# Patient Record
Sex: FEMALE | Race: WHITE | NOT HISPANIC OR LATINO
[De-identification: names, ages, dates, MRNs, and addresses within clinical notes are randomized per-mention and may not be internally consistent; named-entity substitution may affect disease eponyms.]

---

## 2017-06-09 ENCOUNTER — TRANSCRIPTION ENCOUNTER (OUTPATIENT)
Age: 52
End: 2017-06-09

## 2017-12-31 ENCOUNTER — TRANSCRIPTION ENCOUNTER (OUTPATIENT)
Age: 52
End: 2017-12-31

## 2018-09-19 PROBLEM — Z00.00 ENCOUNTER FOR PREVENTIVE HEALTH EXAMINATION: Status: ACTIVE | Noted: 2018-09-19

## 2018-10-19 ENCOUNTER — APPOINTMENT (OUTPATIENT)
Dept: GASTROENTEROLOGY | Facility: CLINIC | Age: 53
End: 2018-10-19
Payer: COMMERCIAL

## 2018-10-19 VITALS
DIASTOLIC BLOOD PRESSURE: 100 MMHG | HEIGHT: 63 IN | OXYGEN SATURATION: 98 % | WEIGHT: 230 LBS | BODY MASS INDEX: 40.75 KG/M2 | RESPIRATION RATE: 15 BRPM | SYSTOLIC BLOOD PRESSURE: 130 MMHG | HEART RATE: 65 BPM

## 2018-10-19 DIAGNOSIS — Z80.3 FAMILY HISTORY OF MALIGNANT NEOPLASM OF BREAST: ICD-10-CM

## 2018-10-19 DIAGNOSIS — Z78.9 OTHER SPECIFIED HEALTH STATUS: ICD-10-CM

## 2018-10-19 PROCEDURE — 99204 OFFICE O/P NEW MOD 45 MIN: CPT

## 2018-10-19 RX ORDER — BENAZEPRIL HYDROCHLORIDE 20 MG/1
20 TABLET, FILM COATED ORAL
Refills: 0 | Status: ACTIVE | COMMUNITY

## 2018-12-04 ENCOUNTER — APPOINTMENT (OUTPATIENT)
Dept: GASTROENTEROLOGY | Facility: CLINIC | Age: 53
End: 2018-12-04
Payer: COMMERCIAL

## 2018-12-04 VITALS — SYSTOLIC BLOOD PRESSURE: 130 MMHG | DIASTOLIC BLOOD PRESSURE: 90 MMHG

## 2018-12-04 VITALS — WEIGHT: 226 LBS | HEIGHT: 63 IN | OXYGEN SATURATION: 98 % | BODY MASS INDEX: 40.04 KG/M2 | RESPIRATION RATE: 16 BRPM

## 2018-12-04 DIAGNOSIS — Z87.19 PERSONAL HISTORY OF OTHER DISEASES OF THE DIGESTIVE SYSTEM: ICD-10-CM

## 2018-12-04 PROCEDURE — 99214 OFFICE O/P EST MOD 30 MIN: CPT

## 2018-12-14 ENCOUNTER — APPOINTMENT (OUTPATIENT)
Dept: GASTROENTEROLOGY | Facility: GI CENTER | Age: 53
End: 2018-12-14
Payer: COMMERCIAL

## 2018-12-14 ENCOUNTER — RESULT REVIEW (OUTPATIENT)
Age: 53
End: 2018-12-14

## 2018-12-14 ENCOUNTER — OUTPATIENT (OUTPATIENT)
Dept: OUTPATIENT SERVICES | Facility: HOSPITAL | Age: 53
LOS: 1 days | End: 2018-12-14
Payer: COMMERCIAL

## 2018-12-14 DIAGNOSIS — Z12.11 ENCOUNTER FOR SCREENING FOR MALIGNANT NEOPLASM OF COLON: ICD-10-CM

## 2018-12-14 PROCEDURE — 45385 COLONOSCOPY W/LESION REMOVAL: CPT | Mod: 33

## 2018-12-14 PROCEDURE — 45385 COLONOSCOPY W/LESION REMOVAL: CPT | Mod: PT

## 2018-12-14 PROCEDURE — 88305 TISSUE EXAM BY PATHOLOGIST: CPT

## 2018-12-14 PROCEDURE — 88305 TISSUE EXAM BY PATHOLOGIST: CPT | Mod: 26

## 2018-12-14 RX ORDER — POLYETHYLENE GLYOCOL 3350, SODIUM CHLORIDE, SODIUM BICARBONATE AND POTASSIUM CHLORIDE 420; 11.2; 5.72; 1.48 G/4L; G/4L; G/4L; G/4L
420 POWDER, FOR SOLUTION NASOGASTRIC; ORAL
Qty: 1 | Refills: 0 | Status: COMPLETED | COMMUNITY
Start: 2018-10-19 | End: 2018-12-14

## 2018-12-18 LAB — SURGICAL PATHOLOGY FINAL REPORT - CH: SIGNIFICANT CHANGE UP

## 2019-01-06 ENCOUNTER — TRANSCRIPTION ENCOUNTER (OUTPATIENT)
Age: 54
End: 2019-01-06

## 2019-01-23 ENCOUNTER — MOBILE ON CALL (OUTPATIENT)
Age: 54
End: 2019-01-23

## 2019-06-27 ENCOUNTER — TRANSCRIPTION ENCOUNTER (OUTPATIENT)
Age: 54
End: 2019-06-27

## 2019-06-27 ENCOUNTER — APPOINTMENT (OUTPATIENT)
Dept: ENDOCRINOLOGY | Facility: CLINIC | Age: 54
End: 2019-06-27
Payer: COMMERCIAL

## 2019-06-27 VITALS
WEIGHT: 228 LBS | HEIGHT: 63 IN | HEART RATE: 84 BPM | SYSTOLIC BLOOD PRESSURE: 122 MMHG | DIASTOLIC BLOOD PRESSURE: 90 MMHG | BODY MASS INDEX: 40.4 KG/M2

## 2019-06-27 PROCEDURE — 99203 OFFICE O/P NEW LOW 30 MIN: CPT

## 2019-06-27 NOTE — HISTORY OF PRESENT ILLNESS
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

## 2019-06-27 NOTE — PHYSICAL EXAM
[No Acute Distress] : no acute distress [Well Nourished] : well nourished [Alert] : alert [Normal Sclera/Conjunctiva] : normal sclera/conjunctiva [Well Developed] : well developed [EOMI] : extra ocular movement intact [No Proptosis] : no proptosis [No Respiratory Distress] : no respiratory distress [No Thyroid Nodules] : there were no palpable thyroid nodules [Normal Rate] : heart rate was normal  [No Accessory Muscle Use] : no accessory muscle use [Clear to Auscultation] : lungs were clear to auscultation bilaterally [Pedal Pulses Normal] : the pedal pulses are present [Regular Rhythm] : with a regular rhythm [Normal S1, S2] : normal S1 and S2 [No Edema] : there was no peripheral edema [Anterior Cervical Nodes] : anterior cervical nodes [Post Cervical Nodes] : posterior cervical nodes [Spine Straight] : spine straight [No Spinal Tenderness] : no spinal tenderness [Normal] : normal and non tender [No Stigmata of Cushings Syndrome] : no stigmata of cushings syndrome [Normal Gait] : normal gait [Normal Strength/Tone] : muscle strength and tone were normal [No Rash] : no rash [Normal Reflexes] : deep tendon reflexes were 2+ and symmetric [Oriented x3] : oriented to person, place, and time [No Tremors] : no tremors [Acanthosis Nigricans] : no acanthosis nigricans [de-identified] : left thryoid greater thant right thryoid in size   firm  nodule on left upper pole  about 3 cm in size ,  extending lateral and posteriorly    NO EDWARD

## 2019-06-27 NOTE — ASSESSMENT
[FreeTextEntry1] : MNG - will send pt to radiology - prefers [facility] as her daughter works there \par  will aspirate 2 left lobe module- can attempt calcified nodule in lower pole\par  and isthrmus nodule \par  NO ASA VIT E OR Omega 3 1 week prior to FNA \par  no heavy lifting  for 2 days after \par reviewd films oN [facility] portal - left nodule with coarse macrocalcification  \par racquel have Thryoseq on stand by \par Hypothryoid Cont RX but take on empty stoamch separate from her benazepril \par \par

## 2019-07-29 ENCOUNTER — APPOINTMENT (OUTPATIENT)
Dept: ENDOCRINOLOGY | Facility: CLINIC | Age: 54
End: 2019-07-29
Payer: COMMERCIAL

## 2019-07-29 VITALS
BODY MASS INDEX: 40.57 KG/M2 | OXYGEN SATURATION: 100 % | HEIGHT: 63 IN | DIASTOLIC BLOOD PRESSURE: 82 MMHG | WEIGHT: 229 LBS | HEART RATE: 65 BPM | SYSTOLIC BLOOD PRESSURE: 124 MMHG

## 2019-07-29 PROCEDURE — 76536 US EXAM OF HEAD AND NECK: CPT

## 2019-07-29 PROCEDURE — 99215 OFFICE O/P EST HI 40 MIN: CPT | Mod: 25

## 2019-08-04 NOTE — ASSESSMENT
[FreeTextEntry1] : MNG - post FNA with pain \par Snogram done here showed stable rigth sided and isthmus nodule\par  left lobe nodviolet obrien increased in size 5 x 4 cm prior measured approxiamtely 2 x 2 cm \par \par willsend pt for stat CT of neck with contrast \par \par \par   whil ept went for CT  pathologist from [facility] called me + PTC in [place]hm[place] \par highly suspicious in right and left lobe\par \par \par CTscan reviewd - needs urgent ENT eval- got appoointmetn with pt for Dr Lemos \par may need thryoidectomy today\par \par received call from  Pathologist at [facility]- all 3 noudels  + PTC \par \par \par dw pt after CT scan on neck - gave her info on appointemnet with Dr Lemos \par as well as pathaology results \par \par Pt racquel go to ENT now

## 2019-08-04 NOTE — HISTORY OF PRESENT ILLNESS
[FreeTextEntry1] : Pt here for follow up  after  FNA MNG \par  Pt called a few hours ago and  statedthat since her FNA- she has been experiencung some nausa and lighheadedness \par   was gettign worse until day 3 - very painful to sallow \par \par took aleve and tylenol - cant swallow saliva today \par  can touch her neck today \par but cannot move left side of neck

## 2019-08-04 NOTE — PHYSICAL EXAM
[Alert] : alert [No Acute Distress] : no acute distress [Well Nourished] : well nourished [Well Developed] : well developed [Normal Sclera/Conjunctiva] : normal sclera/conjunctiva [EOMI] : extra ocular movement intact [No Respiratory Distress] : no respiratory distress [No Accessory Muscle Use] : no accessory muscle use [Clear to Auscultation] : lungs were clear to auscultation bilaterally [Normal Rate] : heart rate was normal  [Normal S1, S2] : normal S1 and S2 [Regular Rhythm] : with a regular rhythm [No Edema] : there was no peripheral edema [Post Cervical Nodes] : posterior cervical nodes [Anterior Cervical Nodes] : anterior cervical nodes [Normal] : normal and non tender [No Stigmata of Cushings Syndrome] : no stigmata of cushings syndrome [Normal Gait] : normal gait [Normal Strength/Tone] : muscle strength and tone were normal [No Rash] : no rash [Acanthosis Nigricans] : no acanthosis nigricans [Normal Reflexes] : deep tendon reflexes were 2+ and symmetric [No Tremors] : no tremors [Oriented x3] : oriented to person, place, and time [de-identified] : left thyroid fullness

## 2019-08-08 ENCOUNTER — OTHER (OUTPATIENT)
Age: 54
End: 2019-08-08

## 2019-08-29 ENCOUNTER — APPOINTMENT (OUTPATIENT)
Dept: ENDOCRINOLOGY | Facility: CLINIC | Age: 54
End: 2019-08-29
Payer: COMMERCIAL

## 2019-08-29 VITALS
WEIGHT: 220 LBS | SYSTOLIC BLOOD PRESSURE: 130 MMHG | DIASTOLIC BLOOD PRESSURE: 96 MMHG | HEART RATE: 94 BPM | BODY MASS INDEX: 38.98 KG/M2 | HEIGHT: 63 IN

## 2019-08-29 PROCEDURE — 99214 OFFICE O/P EST MOD 30 MIN: CPT

## 2019-08-29 RX ORDER — HYDROCORTISONE ACETATE 25 MG/1
25 SUPPOSITORY RECTAL TWICE DAILY
Qty: 28 | Refills: 0 | Status: DISCONTINUED | COMMUNITY
Start: 2018-10-19 | End: 2019-08-29

## 2019-08-29 RX ORDER — LEVOTHYROXINE SODIUM 0.05 MG/1
50 TABLET ORAL
Refills: 0 | Status: DISCONTINUED | COMMUNITY
End: 2019-08-29

## 2019-08-29 RX ORDER — POLYETHYLENE GLYCOL 3350 17 G/17G
17 POWDER, FOR SOLUTION ORAL DAILY
Qty: 3 | Refills: 3 | Status: DISCONTINUED | COMMUNITY
Start: 2018-10-19 | End: 2019-08-29

## 2019-08-29 NOTE — PHYSICAL EXAM
[Alert] : alert [No Acute Distress] : no acute distress [Well Nourished] : well nourished [Well Developed] : well developed [EOMI] : extra ocular movement intact [Normal Sclera/Conjunctiva] : normal sclera/conjunctiva [No Proptosis] : no proptosis [No LAD] : no lymphadenopathy [Well Healed Scar] : well healed scar [No Respiratory Distress] : no respiratory distress [No Accessory Muscle Use] : no accessory muscle use [Clear to Auscultation] : lungs were clear to auscultation bilaterally [Normal Rate] : heart rate was normal  [Normal S1, S2] : normal S1 and S2 [Regular Rhythm] : with a regular rhythm [No Edema] : there was no peripheral edema [Post Cervical Nodes] : posterior cervical nodes [Anterior Cervical Nodes] : anterior cervical nodes [Normal] : normal and non tender [No Stigmata of Cushings Syndrome] : no stigmata of cushings syndrome [Normal Gait] : normal gait [Normal Strength/Tone] : muscle strength and tone were normal [No Rash] : no rash [Acanthosis Nigricans] : no acanthosis nigricans [No Tremors] : no tremors [Normal Reflexes] : deep tendon reflexes were 2+ and symmetric [Oriented x3] : oriented to person, place, and time [de-identified] : midline scar present  [de-identified] : no thryoid felt

## 2019-08-29 NOTE — REVIEW OF SYSTEMS
[Dysphonia] : no dysphonia [Neck Pain] : no neck pain [Negative] : Heme/Lymph [FreeTextEntry4] : voice  alittle  off in high range

## 2019-08-29 NOTE — HISTORY OF PRESENT ILLNESS
[FreeTextEntry1] : Pt is s/p total thryoidectomy tall cell cvariant \par  with local invasion \par  \par s/p toatl thyroidecotmy 8/9/10 with Dr coffey \par ?LNinvolvment but via extrathryoideal etension\par  - doing well on 0.150 mg s LT$ - takign correctly \par  lesspalpitiaon today \par \par no trmors or shake\par

## 2019-08-29 NOTE — ASSESSMENT
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

## 2019-10-28 ENCOUNTER — APPOINTMENT (OUTPATIENT)
Dept: NUCLEAR MEDICINE | Facility: CLINIC | Age: 54
End: 2019-10-28
Payer: COMMERCIAL

## 2019-10-28 ENCOUNTER — OUTPATIENT (OUTPATIENT)
Dept: OUTPATIENT SERVICES | Facility: HOSPITAL | Age: 54
LOS: 1 days | End: 2019-10-28

## 2019-10-28 DIAGNOSIS — C73 MALIGNANT NEOPLASM OF THYROID GLAND: ICD-10-CM

## 2019-10-29 ENCOUNTER — APPOINTMENT (OUTPATIENT)
Dept: ENDOCRINOLOGY | Facility: CLINIC | Age: 54
End: 2019-10-29

## 2019-10-29 ENCOUNTER — APPOINTMENT (OUTPATIENT)
Dept: NUCLEAR MEDICINE | Facility: CLINIC | Age: 54
End: 2019-10-29

## 2019-10-30 ENCOUNTER — APPOINTMENT (OUTPATIENT)
Dept: NUCLEAR MEDICINE | Facility: CLINIC | Age: 54
End: 2019-10-30

## 2019-11-01 ENCOUNTER — APPOINTMENT (OUTPATIENT)
Dept: NUCLEAR MEDICINE | Facility: CLINIC | Age: 54
End: 2019-11-01

## 2019-11-03 ENCOUNTER — FORM ENCOUNTER (OUTPATIENT)
Age: 54
End: 2019-11-03

## 2019-11-04 PROCEDURE — 78016 THYROID MET IMAGING/STUDIES: CPT | Mod: 26

## 2019-11-04 PROCEDURE — 78018 THYROID MET IMAGING BODY: CPT | Mod: 26

## 2019-11-04 PROCEDURE — 78803 RP LOCLZJ TUM SPECT 1 AREA: CPT | Mod: 26

## 2019-11-15 ENCOUNTER — MOBILE ON CALL (OUTPATIENT)
Age: 54
End: 2019-11-15

## 2019-11-17 ENCOUNTER — MOBILE ON CALL (OUTPATIENT)
Age: 54
End: 2019-11-17

## 2019-11-26 ENCOUNTER — CHART COPY (OUTPATIENT)
Age: 54
End: 2019-11-26

## 2019-12-03 ENCOUNTER — APPOINTMENT (OUTPATIENT)
Dept: ENDOCRINOLOGY | Facility: CLINIC | Age: 54
End: 2019-12-03

## 2019-12-10 ENCOUNTER — OUTPATIENT (OUTPATIENT)
Dept: OUTPATIENT SERVICES | Facility: HOSPITAL | Age: 54
LOS: 1 days | End: 2019-12-10

## 2019-12-10 ENCOUNTER — APPOINTMENT (OUTPATIENT)
Dept: NUCLEAR MEDICINE | Facility: CLINIC | Age: 54
End: 2019-12-10
Payer: COMMERCIAL

## 2019-12-10 DIAGNOSIS — C73 MALIGNANT NEOPLASM OF THYROID GLAND: ICD-10-CM

## 2019-12-11 ENCOUNTER — FORM ENCOUNTER (OUTPATIENT)
Age: 54
End: 2019-12-11

## 2019-12-11 ENCOUNTER — APPOINTMENT (OUTPATIENT)
Dept: NUCLEAR MEDICINE | Facility: CLINIC | Age: 54
End: 2019-12-11

## 2019-12-12 ENCOUNTER — APPOINTMENT (OUTPATIENT)
Dept: NUCLEAR MEDICINE | Facility: CLINIC | Age: 54
End: 2019-12-12

## 2019-12-12 PROCEDURE — 79005 NUCLEAR RX ORAL ADMIN: CPT | Mod: 26

## 2019-12-18 ENCOUNTER — FORM ENCOUNTER (OUTPATIENT)
Age: 54
End: 2019-12-18

## 2019-12-19 ENCOUNTER — OUTPATIENT (OUTPATIENT)
Dept: OUTPATIENT SERVICES | Facility: HOSPITAL | Age: 54
LOS: 1 days | End: 2019-12-19

## 2019-12-19 ENCOUNTER — APPOINTMENT (OUTPATIENT)
Dept: NUCLEAR MEDICINE | Facility: CLINIC | Age: 54
End: 2019-12-19
Payer: COMMERCIAL

## 2019-12-19 DIAGNOSIS — Z00.8 ENCOUNTER FOR OTHER GENERAL EXAMINATION: ICD-10-CM

## 2019-12-19 PROCEDURE — 78018 THYROID MET IMAGING BODY: CPT | Mod: 26

## 2020-01-10 ENCOUNTER — APPOINTMENT (OUTPATIENT)
Dept: ENDOCRINOLOGY | Facility: CLINIC | Age: 55
End: 2020-01-10
Payer: COMMERCIAL

## 2020-01-10 VITALS
BODY MASS INDEX: 36.49 KG/M2 | HEART RATE: 100 BPM | WEIGHT: 206 LBS | SYSTOLIC BLOOD PRESSURE: 130 MMHG | DIASTOLIC BLOOD PRESSURE: 80 MMHG

## 2020-01-10 PROCEDURE — 99213 OFFICE O/P EST LOW 20 MIN: CPT

## 2020-01-26 NOTE — PHYSICAL EXAM
[Alert] : alert [No Acute Distress] : no acute distress [Well Nourished] : well nourished [Well Developed] : well developed [Normal Sclera/Conjunctiva] : normal sclera/conjunctiva [No Proptosis] : no proptosis [EOMI] : extra ocular movement intact [No Respiratory Distress] : no respiratory distress [Well Healed Scar] : well healed scar [No LAD] : no lymphadenopathy [Clear to Auscultation] : lungs were clear to auscultation bilaterally [Normal Rate] : heart rate was normal  [No Accessory Muscle Use] : no accessory muscle use [Normal S1, S2] : normal S1 and S2 [Regular Rhythm] : with a regular rhythm [Post Cervical Nodes] : posterior cervical nodes [Anterior Cervical Nodes] : anterior cervical nodes [No Edema] : there was no peripheral edema [Normal] : normal and non tender [No Stigmata of Cushings Syndrome] : no stigmata of cushings syndrome [Normal Gait] : normal gait [Normal Strength/Tone] : muscle strength and tone were normal [No Rash] : no rash [Normal Reflexes] : deep tendon reflexes were 2+ and symmetric [No Tremors] : no tremors [Oriented x3] : oriented to person, place, and time [Acanthosis Nigricans] : no acanthosis nigricans [de-identified] : midline scar present  [de-identified] : no thryoid felt

## 2020-01-26 NOTE — ASSESSMENT
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

## 2020-01-26 NOTE — HISTORY OF PRESENT ILLNESS
[FreeTextEntry1] : Pt is s/p total thryoidectomy tall cell cvariant \par  with local invasion \par  \par s/p total thyroidecotmy 8/9/10 with Dr coffey \par no LN removed\par  pt had  TA tx \par developed thrusgh afterwards  and severe dry mouth \par   been suign biotene\par  tooothpaste and mouth wash \par   doing better with thrush \par \par  - doing well on 0.150 mg s LT$ - takign correctly \par  lesspalpitiaon today \par \par no trmors or shake\par

## 2020-01-26 NOTE — REVIEW OF SYSTEMS
[Negative] : Heme/Lymph [As Noted in HPI] : as noted in HPI [Dysphonia] : no dysphonia [Neck Pain] : no neck pain

## 2020-03-09 ENCOUNTER — APPOINTMENT (OUTPATIENT)
Dept: ENDOCRINOLOGY | Facility: CLINIC | Age: 55
End: 2020-03-09

## 2020-03-10 ENCOUNTER — RX RENEWAL (OUTPATIENT)
Age: 55
End: 2020-03-10

## 2020-03-20 ENCOUNTER — APPOINTMENT (OUTPATIENT)
Dept: GASTROENTEROLOGY | Facility: CLINIC | Age: 55
End: 2020-03-20

## 2020-04-17 ENCOUNTER — APPOINTMENT (OUTPATIENT)
Dept: ENDOCRINOLOGY | Facility: CLINIC | Age: 55
End: 2020-04-17
Payer: COMMERCIAL

## 2020-04-17 PROCEDURE — 99213 OFFICE O/P EST LOW 20 MIN: CPT | Mod: 95

## 2020-04-19 NOTE — HISTORY OF PRESENT ILLNESS
[Home] : at home, [unfilled] , at the time of the visit. [Other Location: e.g. Home (Enter Location, City,State)___] : at [unfilled] [Patient] : the patient [Self] : self [FreeTextEntry1] : Pt is s/p total thryoidectomy tall cell cvariant \par  with local invasion \par on LT4 0.150 m qd \par  \par s/p total thyroidecotmy 8/9/10 with Dr coffey \par no LN removed\par  pt had  TA tx \par pt has been feelign better- saw ENT for dry mouth - gave vinegar - burned \par  saw dentis using  mouth melts which have helped \par \par \par \par \par

## 2020-04-19 NOTE — ASSESSMENT
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

## 2020-04-19 NOTE — REASON FOR VISIT
[Follow - Up] : a follow-up visit [Thyroid Cancer] : thyroid cancer [Follow-Up: _____] : a [unfilled] follow-up visit

## 2020-04-19 NOTE — PHYSICAL EXAM
[Alert] : alert [Healthy Appearance] : healthy appearance [No Acute Distress] : no acute distress [Normal Voice/Communication] : normal voice communication [Oriented x3] : oriented to person, place, and time

## 2020-09-04 ENCOUNTER — RX RENEWAL (OUTPATIENT)
Age: 55
End: 2020-09-04

## 2020-09-22 ENCOUNTER — APPOINTMENT (OUTPATIENT)
Dept: ENDOCRINOLOGY | Facility: CLINIC | Age: 55
End: 2020-09-22
Payer: COMMERCIAL

## 2020-09-22 ENCOUNTER — APPOINTMENT (OUTPATIENT)
Dept: ENDOCRINOLOGY | Facility: CLINIC | Age: 55
End: 2020-09-22

## 2020-09-22 VITALS
BODY MASS INDEX: 38.62 KG/M2 | HEIGHT: 63 IN | WEIGHT: 218 LBS | DIASTOLIC BLOOD PRESSURE: 86 MMHG | HEART RATE: 91 BPM | SYSTOLIC BLOOD PRESSURE: 124 MMHG

## 2020-09-22 PROCEDURE — 99213 OFFICE O/P EST LOW 20 MIN: CPT

## 2020-09-22 NOTE — PHYSICAL EXAM
[Alert] : alert [Well Nourished] : well nourished [No Acute Distress] : no acute distress [Well Developed] : well developed [Normal Sclera/Conjunctiva] : normal sclera/conjunctiva [EOMI] : extra ocular movement intact [No Proptosis] : no proptosis [Well Healed Scar] : well healed scar [No Respiratory Distress] : no respiratory distress [No Accessory Muscle Use] : no accessory muscle use [Clear to Auscultation] : lungs were clear to auscultation bilaterally [Normal S1, S2] : normal S1 and S2 [Normal Rate] : heart rate was normal [Regular Rhythm] : with a regular rhythm [No Edema] : no peripheral edema [Normal Anterior Cervical Nodes] : no anterior cervical lymphadenopathy [Normal Posterior Cervical Nodes] : no posterior cervical lymphadenopathy [No Stigmata of Cushings Syndrome] : no stigmata of Cushings Syndrome [No Tremors] : no tremors [Oriented x3] : oriented to person, place, and time [Normal Affect] : the affect was normal [Normal Mood] : the mood was normal [Acanthosis Nigricans] : no acanthosis nigricans

## 2020-09-22 NOTE — ASSESSMENT
[FreeTextEntry1] : 55 year old female with hypothyroidism s/p thyroidectomy and I-131 for thyroid cancer (tall cell variant with extrathyroidal extension). Goal TSH 0.01 to 0.1.\par -TSH at goal (0.081).\par -Continue current dose of LT4.\par -Ok to return to work.\par -Detectable Tg, but lower than in the past (although done at different labs).\par -Check neck ultrasound now.\par -Repeat TFTs with Tg and CMP in 3 months.\par \par \par

## 2020-09-22 NOTE — REVIEW OF SYSTEMS
[Fatigue] : fatigue [Recent Weight Gain (___ Lbs)] : recent weight gain: [unfilled] lbs [Neck Pain] : neck pain [Recent Weight Loss (___ Lbs)] : no recent weight loss [Dysphagia] : no dysphagia [Dysphonia] : no dysphonia [Palpitations] : no palpitations [Constipation] : no constipation [Diarrhea] : no diarrhea [Muscle Weakness] : no muscle weakness [Myalgia] : no myalgia  [Dry Skin] : no dry skin [Hair Loss] : no hair loss [Tremors] : no tremors [Depression] : no depression [Anxiety] : no anxiety [Cold Intolerance] : no cold intolerance [Heat Intolerance] : no heat intolerance [Swelling] : no swelling [FreeTextEntry4] : pain and itchy at incision site [FreeTextEntry7] : h/o IBS

## 2020-09-22 NOTE — HISTORY OF PRESENT ILLNESS
[FreeTextEntry1] : Patient presents today for follow-up of hypothyroidism s/p total thyroidectomy August 2019 for thyroid cancer, tall cell variant with local invasion. No lymph nodes removed. Had TA, resulting in damage to salivary glands now with dry mouth and dental issues. Had oral surgery two weeks ago.\par \par Current regimen:\par LT4 150 mcg daily, takes appropriately.\par Reports intermittent pain and itching at incision site.\par Denies dysphagia and voice changes.

## 2020-12-14 ENCOUNTER — OUTPATIENT (OUTPATIENT)
Dept: OUTPATIENT SERVICES | Facility: HOSPITAL | Age: 55
LOS: 1 days | End: 2020-12-14

## 2020-12-14 ENCOUNTER — APPOINTMENT (OUTPATIENT)
Dept: NUCLEAR MEDICINE | Facility: CLINIC | Age: 55
End: 2020-12-14
Payer: COMMERCIAL

## 2020-12-14 DIAGNOSIS — C73 MALIGNANT NEOPLASM OF THYROID GLAND: ICD-10-CM

## 2020-12-15 ENCOUNTER — APPOINTMENT (OUTPATIENT)
Dept: NUCLEAR MEDICINE | Facility: CLINIC | Age: 55
End: 2020-12-15

## 2020-12-16 ENCOUNTER — APPOINTMENT (OUTPATIENT)
Dept: NUCLEAR MEDICINE | Facility: CLINIC | Age: 55
End: 2020-12-16

## 2020-12-16 PROBLEM — Z12.11 ENCOUNTER FOR SCREENING COLONOSCOPY: Status: RESOLVED | Noted: 2018-10-19 | Resolved: 2020-12-16

## 2020-12-18 ENCOUNTER — RESULT REVIEW (OUTPATIENT)
Age: 55
End: 2020-12-18

## 2020-12-18 ENCOUNTER — APPOINTMENT (OUTPATIENT)
Dept: NUCLEAR MEDICINE | Facility: CLINIC | Age: 55
End: 2020-12-18

## 2020-12-18 ENCOUNTER — LABORATORY RESULT (OUTPATIENT)
Age: 55
End: 2020-12-18

## 2020-12-18 PROCEDURE — 78018 THYROID MET IMAGING BODY: CPT | Mod: 26

## 2020-12-18 PROCEDURE — 78830 RP LOCLZJ TUM SPECT W/CT 1: CPT | Mod: 26

## 2020-12-21 ENCOUNTER — RESULT REVIEW (OUTPATIENT)
Age: 55
End: 2020-12-21

## 2020-12-21 PROCEDURE — 78016 THYROID MET IMAGING/STUDIES: CPT | Mod: 26

## 2021-01-20 ENCOUNTER — APPOINTMENT (OUTPATIENT)
Dept: ENDOCRINOLOGY | Facility: CLINIC | Age: 56
End: 2021-01-20
Payer: COMMERCIAL

## 2021-01-20 VITALS
WEIGHT: 212 LBS | SYSTOLIC BLOOD PRESSURE: 122 MMHG | DIASTOLIC BLOOD PRESSURE: 80 MMHG | BODY MASS INDEX: 37.56 KG/M2 | HEIGHT: 63 IN

## 2021-01-20 PROCEDURE — 99214 OFFICE O/P EST MOD 30 MIN: CPT

## 2021-01-20 PROCEDURE — 99072 ADDL SUPL MATRL&STAF TM PHE: CPT

## 2021-01-25 NOTE — HISTORY OF PRESENT ILLNESS
[FreeTextEntry1] : Patient presents today for follow-up of hypothyroidism s/p total thyroidectomy August 2019 for thyroid cancer, tall cell variant with local invasion. No lymph nodes removed. Had TA, resulting in damage to salivary glands now with dry mouth and dental issues.   had scan in Dec - all negative TGthryogen stim TG  less that 0.2 \par \par Current regimen:\par LT4 150 mcg daily, takes appropriately.\par Reports intermittent pain and itching at incision site.\par Denies dysphagia and voice changes.\par seing GI IBS symptoms at times \par    has never follwoup with surgeon last year  \par

## 2021-01-25 NOTE — PHYSICAL EXAM
[Alert] : alert [Well Nourished] : well nourished [No Acute Distress] : no acute distress [Well Developed] : well developed [Normal Sclera/Conjunctiva] : normal sclera/conjunctiva [EOMI] : extra ocular movement intact [No Proptosis] : no proptosis [Well Healed Scar] : well healed scar [No Respiratory Distress] : no respiratory distress [No Accessory Muscle Use] : no accessory muscle use [Clear to Auscultation] : lungs were clear to auscultation bilaterally [Normal S1, S2] : normal S1 and S2 [Normal Rate] : heart rate was normal [Regular Rhythm] : with a regular rhythm [No Edema] : no peripheral edema [Normal Anterior Cervical Nodes] : no anterior cervical lymphadenopathy [No Stigmata of Cushings Syndrome] : no stigmata of Cushings Syndrome [No Tremors] : no tremors [Oriented x3] : oriented to person, place, and time [Normal Affect] : the affect was normal [Normal Mood] : the mood was normal [Acanthosis Nigricans] : no acanthosis nigricans [de-identified] : but thickend scar

## 2021-01-25 NOTE — ASSESSMENT
[FreeTextEntry1] : 55 year old female with hypothyroidism s/p thyroidectomy and I-131 for thyroid cancer (tall cell variant with extrathyroidal extension). Goal TSH 0.01 to 0.1.\par -TSH at goal (0.081).\par -Continue current dose of LT4.\par -Ok to return to work.\par   thryogen stim WBS showed TIGIST   ? hiatal hernia- see GI\par  TG  level 0.2 after thryogen stim \par  no furhter tx needed with TA \par \par -Check neck ultrasound now.\par -Repeat TFTs with Tg and CMP in 3 months.\par \par \par GI scar- and salivary gland issues- see surgeon   may have other idease to help with dry mouth \par \par

## 2021-01-25 NOTE — REVIEW OF SYSTEMS
[Fatigue] : fatigue [Recent Weight Gain (___ Lbs)] : recent weight gain: [unfilled] lbs [Neck Pain] : neck pain [Dysphagia] : no dysphagia [Recent Weight Loss (___ Lbs)] : no recent weight loss [Dysphonia] : no dysphonia [Palpitations] : no palpitations [Constipation] : no constipation [Diarrhea] : no diarrhea [Muscle Weakness] : no muscle weakness [Dry Skin] : no dry skin [Myalgia] : no myalgia  [Hair Loss] : no hair loss [Tremors] : no tremors [Depression] : no depression [Anxiety] : no anxiety [Cold Intolerance] : no cold intolerance [Heat Intolerance] : no heat intolerance [Swelling] : no swelling [FreeTextEntry4] : pain and itchy at incision site [FreeTextEntry7] : h/o IBS

## 2021-03-05 ENCOUNTER — RX RENEWAL (OUTPATIENT)
Age: 56
End: 2021-03-05

## 2021-05-12 ENCOUNTER — APPOINTMENT (OUTPATIENT)
Dept: ENDOCRINOLOGY | Facility: CLINIC | Age: 56
End: 2021-05-12

## 2021-07-22 ENCOUNTER — NON-APPOINTMENT (OUTPATIENT)
Age: 56
End: 2021-07-22

## 2021-07-23 ENCOUNTER — APPOINTMENT (OUTPATIENT)
Dept: GASTROENTEROLOGY | Facility: CLINIC | Age: 56
End: 2021-07-23
Payer: COMMERCIAL

## 2021-07-23 VITALS
BODY MASS INDEX: 38.45 KG/M2 | DIASTOLIC BLOOD PRESSURE: 84 MMHG | SYSTOLIC BLOOD PRESSURE: 118 MMHG | OXYGEN SATURATION: 98 % | TEMPERATURE: 98 F | RESPIRATION RATE: 14 BRPM | HEIGHT: 63 IN | HEART RATE: 86 BPM | WEIGHT: 217 LBS

## 2021-07-23 DIAGNOSIS — Z78.9 OTHER SPECIFIED HEALTH STATUS: ICD-10-CM

## 2021-07-23 DIAGNOSIS — Z76.89 PERSONS ENCOUNTERING HEALTH SERVICES IN OTHER SPECIFIED CIRCUMSTANCES: ICD-10-CM

## 2021-07-23 PROCEDURE — 99072 ADDL SUPL MATRL&STAF TM PHE: CPT

## 2021-07-23 PROCEDURE — 99214 OFFICE O/P EST MOD 30 MIN: CPT

## 2021-07-23 NOTE — ASSESSMENT
[FreeTextEntry1] : The patient presents for evaluation for abnormal PET scan. She will undergo both a colonoscopy and upper endoscopy. I have discussed the indications, benefits, risks (including but not limited to reaction to the anesthesia, infection, bleeding, and perforation),  and alternatives to a colonoscopy and EGD with the patient. She understands all options and has agreed to have both procedures. She is medically optimized. \par \par Suprep\par Get recent labs form PCP

## 2021-07-23 NOTE — HISTORY OF PRESENT ILLNESS
[Heartburn] : denies heartburn [Nausea] : denies nausea [Vomiting] : denies vomiting [Diarrhea] : denies diarrhea [Constipation] : stable constipation [Yellow Skin Or Eyes (Jaundice)] : denies jaundice [Abdominal Swelling] : denies abdominal swelling [Abdominal Pain] : denies abdominal pain [Rectal Pain] : denies rectal pain [Malignancy] : malignancy [_________] : Performed [unfilled] [de-identified] : FLORENCIO BENJAMIN is a 56 year old female presenting today for a follow up visit. She was last seen in 2018 by Dr. East for chronic constipation and a colonosocpy was performed on 12/14/18 that showed a 7mm sessile serrated adenoma of the transverse colon. Repeat colonoscopy recommended in 3 years. Family history is negative for colon cancer. She still reports chronic constipation, typically moving her bowels 1-2 times a week. She uses stool softeners and Metamucil but admits that she is not consistent with it. No abdominal pain, black or bloody stools. \par In 2019 she was diagnosed with thyroid cancer and underwent a radical thyroidectomy. Last year she underwent a PET scan that showed uptake in the gastric mucosa. No previous endoscopies. She denies any epigastric pain, heartburn, acid reflux, nausea, vomiting, dysphagia or odynophagia. Her appetite is good and weight is stable. \par medical history includes HTN and hypothyroid due to thyroidectomy. BMI 38.44. [de-identified] : as noted above

## 2021-08-09 ENCOUNTER — TRANSCRIPTION ENCOUNTER (OUTPATIENT)
Age: 56
End: 2021-08-09

## 2021-08-16 DIAGNOSIS — Z01.818 ENCOUNTER FOR OTHER PREPROCEDURAL EXAMINATION: ICD-10-CM

## 2021-08-20 ENCOUNTER — APPOINTMENT (OUTPATIENT)
Dept: DISASTER EMERGENCY | Facility: CLINIC | Age: 56
End: 2021-08-20

## 2021-08-21 LAB — SARS-COV-2 N GENE NPH QL NAA+PROBE: NOT DETECTED

## 2021-08-22 ENCOUNTER — TRANSCRIPTION ENCOUNTER (OUTPATIENT)
Age: 56
End: 2021-08-22

## 2021-08-23 ENCOUNTER — RESULT REVIEW (OUTPATIENT)
Age: 56
End: 2021-08-23

## 2021-08-23 ENCOUNTER — OUTPATIENT (OUTPATIENT)
Dept: OUTPATIENT SERVICES | Facility: HOSPITAL | Age: 56
LOS: 1 days | End: 2021-08-23
Payer: COMMERCIAL

## 2021-08-23 ENCOUNTER — APPOINTMENT (OUTPATIENT)
Dept: GASTROENTEROLOGY | Facility: GI CENTER | Age: 56
End: 2021-08-23
Payer: COMMERCIAL

## 2021-08-23 DIAGNOSIS — K64.4 RESIDUAL HEMORRHOIDAL SKIN TAGS: ICD-10-CM

## 2021-08-23 DIAGNOSIS — R94.8 ABNORMAL RESULTS OF FUNCTION STUDIES OF OTHER ORGANS AND SYSTEMS: ICD-10-CM

## 2021-08-23 DIAGNOSIS — K62.5 HEMORRHAGE OF ANUS AND RECTUM: ICD-10-CM

## 2021-08-23 DIAGNOSIS — K57.30 DIVERTICULOSIS OF LARGE INTESTINE W/OUT PERFORATION OR ABSCESS W/OUT BLEEDING: ICD-10-CM

## 2021-08-23 DIAGNOSIS — K44.9 DIAPHRAGMATIC HERNIA W/OUT OBSTRUCTION OR GANGRENE: ICD-10-CM

## 2021-08-23 DIAGNOSIS — K29.90 GASTRODUODENITIS, UNSPECIFIED, W/OUT BLEEDING: ICD-10-CM

## 2021-08-23 DIAGNOSIS — K63.5 POLYP OF COLON: ICD-10-CM

## 2021-08-23 DIAGNOSIS — R82.90 UNSPECIFIED ABNORMAL FINDINGS IN URINE: ICD-10-CM

## 2021-08-23 PROCEDURE — 43239 EGD BIOPSY SINGLE/MULTIPLE: CPT

## 2021-08-23 PROCEDURE — 88342 IMHCHEM/IMCYTCHM 1ST ANTB: CPT

## 2021-08-23 PROCEDURE — 88305 TISSUE EXAM BY PATHOLOGIST: CPT | Mod: 26

## 2021-08-23 PROCEDURE — 88305 TISSUE EXAM BY PATHOLOGIST: CPT

## 2021-08-23 PROCEDURE — 45378 DIAGNOSTIC COLONOSCOPY: CPT

## 2021-08-23 PROCEDURE — 88342 IMHCHEM/IMCYTCHM 1ST ANTB: CPT | Mod: 26

## 2021-08-23 PROCEDURE — 43239 EGD BIOPSY SINGLE/MULTIPLE: CPT | Mod: 59

## 2021-08-23 NOTE — HISTORY OF PRESENT ILLNESS
[de-identified] : Patient arrived for EGD and colonoscopy. She has history of colon polyps and abnormal PET scan revealing gastritis. Has been complaining of rectal bleeding.

## 2021-08-23 NOTE — PHYSICAL EXAM
[General Appearance - Alert] : alert [General Appearance - In No Acute Distress] : in no acute distress [Sclera] : the sclera and conjunctiva were normal [Extraocular Movements] : extraocular movements were intact [PERRL With Normal Accommodation] : pupils were equal in size, round, and reactive to light [Outer Ear] : the ears and nose were normal in appearance [Oropharynx] : the oropharynx was normal [Neck Appearance] : the appearance of the neck was normal [Neck Cervical Mass (___cm)] : no neck mass was observed [Jugular Venous Distention Increased] : there was no jugular-venous distention [Thyroid Nodule] : there were no palpable thyroid nodules [Thyroid Diffuse Enlargement] : the thyroid was not enlarged [Auscultation Breath Sounds / Voice Sounds] : lungs were clear to auscultation bilaterally [Heart Rate And Rhythm] : heart rate was normal and rhythm regular [Heart Sounds] : normal S1 and S2 [Heart Sounds Gallop] : no gallops [Murmurs] : no murmurs [Bowel Sounds] : normal bowel sounds [Heart Sounds Pericardial Friction Rub] : no pericardial rub [Abdomen Soft] : soft [Abdomen Tenderness] : non-tender [] : no hepato-splenomegaly [Abdomen Mass (___ Cm)] : no abdominal mass palpated

## 2021-08-26 LAB — SURGICAL PATHOLOGY STUDY: SIGNIFICANT CHANGE UP

## 2021-09-11 ENCOUNTER — RX RENEWAL (OUTPATIENT)
Age: 56
End: 2021-09-11

## 2021-09-15 ENCOUNTER — APPOINTMENT (OUTPATIENT)
Dept: ENDOCRINOLOGY | Facility: CLINIC | Age: 56
End: 2021-09-15

## 2021-10-19 ENCOUNTER — LABORATORY RESULT (OUTPATIENT)
Age: 56
End: 2021-10-19

## 2021-11-02 PROBLEM — R82.90 ABNORMAL URINE: Status: ACTIVE | Noted: 2021-11-02

## 2022-02-09 ENCOUNTER — LABORATORY RESULT (OUTPATIENT)
Age: 57
End: 2022-02-09

## 2022-02-11 ENCOUNTER — APPOINTMENT (OUTPATIENT)
Dept: ENDOCRINOLOGY | Facility: CLINIC | Age: 57
End: 2022-02-11
Payer: COMMERCIAL

## 2022-02-11 VITALS
SYSTOLIC BLOOD PRESSURE: 128 MMHG | HEART RATE: 97 BPM | DIASTOLIC BLOOD PRESSURE: 72 MMHG | WEIGHT: 215 LBS | BODY MASS INDEX: 38.09 KG/M2 | HEIGHT: 63 IN | OXYGEN SATURATION: 97 %

## 2022-02-11 PROCEDURE — 99214 OFFICE O/P EST MOD 30 MIN: CPT

## 2022-03-04 NOTE — HISTORY OF PRESENT ILLNESS
[FreeTextEntry1] : Patient here for follow-up of hypothyroidism s/p total thyroidectomy August 2019 for thyroid cancer, tall cell variant with local invasion. No lymph nodes removed. Had TA, resulting in damage to salivary glands now with dry mouth and dental issues.   had scan in Dec - all negative TGthryogen stim TG  less that 0.2 \par \par Current regimen:\par LT4 150 mcg daily, takes appropriately.\par

## 2022-03-04 NOTE — REVIEW OF SYSTEMS
[Fatigue] : no fatigue [Recent Weight Gain (___ Lbs)] : no recent weight gain [Recent Weight Loss (___ Lbs)] : no recent weight loss [Dysphagia] : no dysphagia [Neck Pain] : no neck pain [Dysphonia] : no dysphonia [Palpitations] : no palpitations [Constipation] : no constipation [Diarrhea] : no diarrhea [Muscle Weakness] : no muscle weakness [Myalgia] : no myalgia  [Dry Skin] : no dry skin [Hair Loss] : no hair loss [Tremors] : no tremors [Depression] : no depression [Anxiety] : no anxiety [Cold Intolerance] : no cold intolerance [Heat Intolerance] : no heat intolerance [Swelling] : no swelling [FreeTextEntry7] : h/o IBS

## 2022-03-04 NOTE — ASSESSMENT
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 Goal TSH 0.01 to 0.1.\par -TSH at goal .\par -Continue current dose of LT4.will have pt get scheduel for thryogen stim test this year \par \par -Check neck ultrasound now.\par -Repeat TFTs with Tg and CMP in6 months.\par \par \par GI scar- and salivary gland issues\par \par

## 2022-03-04 NOTE — PHYSICAL EXAM
[Alert] : alert [Well Nourished] : well nourished [No Acute Distress] : no acute distress [Well Developed] : well developed [Normal Sclera/Conjunctiva] : normal sclera/conjunctiva [No Proptosis] : no proptosis [EOMI] : extra ocular movement intact [Well Healed Scar] : well healed scar [No Respiratory Distress] : no respiratory distress [No Accessory Muscle Use] : no accessory muscle use [Clear to Auscultation] : lungs were clear to auscultation bilaterally [Normal S1, S2] : normal S1 and S2 [Normal Rate] : heart rate was normal [Regular Rhythm] : with a regular rhythm [No Edema] : no peripheral edema [Normal Anterior Cervical Nodes] : no anterior cervical lymphadenopathy [Acanthosis Nigricans] : no acanthosis nigricans [No Stigmata of Cushings Syndrome] : no stigmata of Cushings Syndrome [No Tremors] : no tremors [Oriented x3] : oriented to person, place, and time [Normal Affect] : the affect was normal [Normal Mood] : the mood was normal

## 2022-03-17 ENCOUNTER — RX RENEWAL (OUTPATIENT)
Age: 57
End: 2022-03-17

## 2022-08-15 ENCOUNTER — LABORATORY RESULT (OUTPATIENT)
Age: 57
End: 2022-08-15

## 2022-08-17 ENCOUNTER — APPOINTMENT (OUTPATIENT)
Dept: ENDOCRINOLOGY | Facility: CLINIC | Age: 57
End: 2022-08-17

## 2022-08-17 VITALS — BODY MASS INDEX: 35.08 KG/M2 | WEIGHT: 198 LBS | HEIGHT: 63 IN

## 2022-08-17 VITALS — OXYGEN SATURATION: 98 % | HEART RATE: 91 BPM | SYSTOLIC BLOOD PRESSURE: 128 MMHG | DIASTOLIC BLOOD PRESSURE: 82 MMHG

## 2022-08-17 LAB
FERRITIN SERPL-MCNC: 29 NG/ML
IRON SATN MFR SERPL: 16 %
IRON SERPL-MCNC: 50 UG/DL
TIBC SERPL-MCNC: 314 UG/DL
UIBC SERPL-MCNC: 263 UG/DL

## 2022-08-17 PROCEDURE — 99214 OFFICE O/P EST MOD 30 MIN: CPT

## 2022-08-28 NOTE — PHYSICAL EXAM
[Alert] : alert [Well Nourished] : well nourished [No Acute Distress] : no acute distress [Well Developed] : well developed [Normal Sclera/Conjunctiva] : normal sclera/conjunctiva [EOMI] : extra ocular movement intact [No Proptosis] : no proptosis [Well Healed Scar] : well healed scar [No Respiratory Distress] : no respiratory distress [No Accessory Muscle Use] : no accessory muscle use [Clear to Auscultation] : lungs were clear to auscultation bilaterally [Normal S1, S2] : normal S1 and S2 [Normal Rate] : heart rate was normal [Regular Rhythm] : with a regular rhythm [No Edema] : no peripheral edema [Normal Anterior Cervical Nodes] : no anterior cervical lymphadenopathy [No Stigmata of Cushings Syndrome] : no stigmata of Cushings Syndrome [No Tremors] : no tremors [Oriented x3] : oriented to person, place, and time [Normal Affect] : the affect was normal [Normal Mood] : the mood was normal [Acanthosis Nigricans] : no acanthosis nigricans

## 2022-09-12 ENCOUNTER — RX RENEWAL (OUTPATIENT)
Age: 57
End: 2022-09-12

## 2022-10-31 ENCOUNTER — OUTPATIENT (OUTPATIENT)
Dept: OUTPATIENT SERVICES | Facility: HOSPITAL | Age: 57
LOS: 1 days | End: 2022-10-31

## 2022-10-31 ENCOUNTER — APPOINTMENT (OUTPATIENT)
Dept: NUCLEAR MEDICINE | Facility: CLINIC | Age: 57
End: 2022-10-31

## 2022-10-31 DIAGNOSIS — C73 MALIGNANT NEOPLASM OF THYROID GLAND: ICD-10-CM

## 2022-11-01 ENCOUNTER — APPOINTMENT (OUTPATIENT)
Dept: NUCLEAR MEDICINE | Facility: CLINIC | Age: 57
End: 2022-11-01

## 2022-11-02 ENCOUNTER — APPOINTMENT (OUTPATIENT)
Dept: NUCLEAR MEDICINE | Facility: CLINIC | Age: 57
End: 2022-11-02

## 2022-11-04 ENCOUNTER — RESULT REVIEW (OUTPATIENT)
Age: 57
End: 2022-11-04

## 2022-11-04 ENCOUNTER — LABORATORY RESULT (OUTPATIENT)
Age: 57
End: 2022-11-04

## 2022-11-04 ENCOUNTER — APPOINTMENT (OUTPATIENT)
Dept: NUCLEAR MEDICINE | Facility: CLINIC | Age: 57
End: 2022-11-04

## 2022-11-04 PROCEDURE — 78018 THYROID MET IMAGING BODY: CPT | Mod: 26

## 2022-12-25 ENCOUNTER — NON-APPOINTMENT (OUTPATIENT)
Age: 57
End: 2022-12-25

## 2023-03-11 ENCOUNTER — RX RENEWAL (OUTPATIENT)
Age: 58
End: 2023-03-11

## 2023-09-08 ENCOUNTER — RX RENEWAL (OUTPATIENT)
Age: 58
End: 2023-09-08

## 2023-09-29 ENCOUNTER — NON-APPOINTMENT (OUTPATIENT)
Age: 58
End: 2023-09-29

## 2023-12-15 ENCOUNTER — RX RENEWAL (OUTPATIENT)
Age: 58
End: 2023-12-15

## 2023-12-21 ENCOUNTER — APPOINTMENT (OUTPATIENT)
Dept: ENDOCRINOLOGY | Facility: CLINIC | Age: 58
End: 2023-12-21
Payer: COMMERCIAL

## 2023-12-21 VITALS
SYSTOLIC BLOOD PRESSURE: 122 MMHG | HEART RATE: 87 BPM | WEIGHT: 217 LBS | HEIGHT: 63 IN | DIASTOLIC BLOOD PRESSURE: 86 MMHG | BODY MASS INDEX: 38.45 KG/M2 | OXYGEN SATURATION: 97 %

## 2023-12-21 DIAGNOSIS — R63.5 ABNORMAL WEIGHT GAIN: ICD-10-CM

## 2023-12-21 PROCEDURE — 99214 OFFICE O/P EST MOD 30 MIN: CPT

## 2023-12-26 LAB
25(OH)D3 SERPL-MCNC: 22.1 NG/ML
ALBUMIN SERPL ELPH-MCNC: 4 G/DL
ALP BLD-CCNC: 81 U/L
ALT SERPL-CCNC: 22 U/L
ANION GAP SERPL CALC-SCNC: 11 MMOL/L
APPEARANCE: CLEAR
AST SERPL-CCNC: 15 U/L
BILIRUB SERPL-MCNC: 0.2 MG/DL
BILIRUBIN URINE: NEGATIVE
BLOOD URINE: NEGATIVE
BUN SERPL-MCNC: 17 MG/DL
CALCIUM SERPL-MCNC: 9.2 MG/DL
CHLORIDE SERPL-SCNC: 103 MMOL/L
CHOLEST SERPL-MCNC: 220 MG/DL
CO2 SERPL-SCNC: 24 MMOL/L
COLOR: YELLOW
CREAT SERPL-MCNC: 1.01 MG/DL
CREAT SPEC-SCNC: 91 MG/DL
EGFR: 64 ML/MIN/1.73M2
ESTIMATED AVERAGE GLUCOSE: 111 MG/DL
GLUCOSE QUALITATIVE U: NEGATIVE MG/DL
GLUCOSE SERPL-MCNC: 95 MG/DL
HBA1C MFR BLD HPLC: 5.5 %
HCT VFR BLD CALC: 42.4 %
HDLC SERPL-MCNC: 82 MG/DL
HGB BLD-MCNC: 13.2 G/DL
KETONES URINE: NEGATIVE MG/DL
LDLC SERPL CALC-MCNC: 125 MG/DL
LEUKOCYTE ESTERASE URINE: NEGATIVE
MAGNESIUM SERPL-MCNC: 2.1 MG/DL
MCHC RBC-ENTMCNC: 25.8 PG
MCHC RBC-ENTMCNC: 31.1 GM/DL
MCV RBC AUTO: 83 FL
MICROALBUMIN 24H UR DL<=1MG/L-MCNC: <1.2 MG/DL
MICROALBUMIN/CREAT 24H UR-RTO: NORMAL MG/G
NITRITE URINE: NEGATIVE
NONHDLC SERPL-MCNC: 138 MG/DL
PH URINE: 6.5
PHOSPHATE SERPL-MCNC: 4.4 MG/DL
PLATELET # BLD AUTO: 281 K/UL
POTASSIUM SERPL-SCNC: 4.8 MMOL/L
PROT SERPL-MCNC: 6.5 G/DL
PROTEIN URINE: NEGATIVE MG/DL
RBC # BLD: 5.11 M/UL
RBC # FLD: 13.5 %
SODIUM SERPL-SCNC: 138 MMOL/L
SPECIFIC GRAVITY URINE: 1.02
T3FREE SERPL-MCNC: 2.93 PG/ML
T4 FREE SERPL-MCNC: 1.9 NG/DL
THYROGLOB AB SERPL-ACNC: <20 IU/ML
THYROGLOB AB SERPL-ACNC: <20 IU/ML
THYROGLOB SERPL-MCNC: <0.2 NG/ML
THYROPEROXIDASE AB SERPL IA-ACNC: <10 IU/ML
TRIGL SERPL-MCNC: 75 MG/DL
TSH SERPL-ACNC: 0.05 UIU/ML
UROBILINOGEN URINE: 0.2 MG/DL
VIT B12 SERPL-MCNC: 536 PG/ML
WBC # FLD AUTO: 5.33 K/UL

## 2024-01-08 NOTE — PHYSICAL EXAM
[Alert] : alert [Well Nourished] : well nourished [No Acute Distress] : no acute distress [Well Developed] : well developed [Normal Sclera/Conjunctiva] : normal sclera/conjunctiva [EOMI] : extra ocular movement intact [No Proptosis] : no proptosis [Well Healed Scar] : well healed scar [No Respiratory Distress] : no respiratory distress [No Accessory Muscle Use] : no accessory muscle use [Clear to Auscultation] : lungs were clear to auscultation bilaterally [Normal S1, S2] : normal S1 and S2 [Normal Rate] : heart rate was normal [Regular Rhythm] : with a regular rhythm [No Edema] : no peripheral edema [Normal Anterior Cervical Nodes] : no anterior cervical lymphadenopathy [No Stigmata of Cushings Syndrome] : no stigmata of Cushings Syndrome [Acanthosis Nigricans] : no acanthosis nigricans [No Tremors] : no tremors [Oriented x3] : oriented to person, place, and time [Normal Affect] : the affect was normal [Normal Mood] : the mood was normal

## 2024-01-08 NOTE — ASSESSMENT
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 Goal TSH 0.01 to 0.1.  -TSH at goal.  -Continue current dose of [id number] thyroigen stim WBS  next year   TG undetectable  -Check neck ultrasound now.  -Repeat TFTs with Tg and CMP iin 6 month      salivary gland affected by I131- see Ent

## 2024-01-17 ENCOUNTER — RX CHANGE (OUTPATIENT)
Age: 59
End: 2024-01-17

## 2024-01-17 RX ORDER — METFORMIN ER 500 MG 500 MG/1
500 TABLET ORAL DAILY
Qty: 30 | Refills: 2 | Status: DISCONTINUED | COMMUNITY
Start: 2023-12-21 | End: 2024-01-17

## 2024-02-06 ENCOUNTER — APPOINTMENT (OUTPATIENT)
Dept: ENDOCRINOLOGY | Facility: CLINIC | Age: 59
End: 2024-02-06

## 2024-02-22 ENCOUNTER — NON-APPOINTMENT (OUTPATIENT)
Age: 59
End: 2024-02-22

## 2024-06-18 ENCOUNTER — APPOINTMENT (OUTPATIENT)
Dept: ENDOCRINOLOGY | Facility: CLINIC | Age: 59
End: 2024-06-18
Payer: COMMERCIAL

## 2024-06-18 VITALS
BODY MASS INDEX: 38.45 KG/M2 | OXYGEN SATURATION: 99 % | HEART RATE: 91 BPM | DIASTOLIC BLOOD PRESSURE: 84 MMHG | HEIGHT: 63 IN | WEIGHT: 217 LBS | SYSTOLIC BLOOD PRESSURE: 138 MMHG

## 2024-06-18 DIAGNOSIS — E66.9 OBESITY, UNSPECIFIED: ICD-10-CM

## 2024-06-18 DIAGNOSIS — E04.2 NONTOXIC MULTINODULAR GOITER: ICD-10-CM

## 2024-06-18 DIAGNOSIS — E89.0 POSTPROCEDURAL HYPOTHYROIDISM: ICD-10-CM

## 2024-06-18 DIAGNOSIS — C73 MALIGNANT NEOPLASM OF THYROID GLAND: ICD-10-CM

## 2024-06-18 DIAGNOSIS — I10 ESSENTIAL (PRIMARY) HYPERTENSION: ICD-10-CM

## 2024-06-18 DIAGNOSIS — Z80.8 FAMILY HISTORY OF MALIGNANT NEOPLASM OF OTHER ORGANS OR SYSTEMS: ICD-10-CM

## 2024-06-18 PROCEDURE — 99215 OFFICE O/P EST HI 40 MIN: CPT

## 2024-06-18 RX ORDER — LEVOTHYROXINE SODIUM 0.15 MG/1
150 TABLET ORAL
Qty: 90 | Refills: 1 | Status: ACTIVE | COMMUNITY
Start: 2020-03-10 | End: 1900-01-01

## 2024-06-18 RX ORDER — SODIUM SULFATE, POTASSIUM SULFATE, MAGNESIUM SULFATE 17.5; 3.13; 1.6 G/ML; G/ML; G/ML
17.5-3.13-1.6 SOLUTION, CONCENTRATE ORAL
Qty: 1 | Refills: 0 | Status: DISCONTINUED | COMMUNITY
Start: 2021-07-23 | End: 2024-06-18

## 2024-06-18 RX ORDER — METFORMIN ER 500 MG 500 MG/1
500 TABLET ORAL
Qty: 90 | Refills: 1 | Status: DISCONTINUED | COMMUNITY
End: 2024-06-18

## 2024-06-18 NOTE — ASSESSMENT
[FreeTextEntry1] : 59 yr old female with  1. Papillary thyroid cancer, tall cell variant (dx in 2019) s/p total thyroidectomy, s/p I-131 remnant ablation 149 mCI with post treatment scans negative for recurrance/mets.  Tg and Tg ab levels have been negative (except 1 mild elevation in 2020), Stim Tg levels have been negative.  Excellent reponse to therapy but given aggressive histology and large tumor on initial path with extrathyroidal extension and angioivasion,  will maintain on thyroid suppression.  - cont LT4 150 mcg daily, updated TFTs and Tg levels needed - check neck sonogram - long term risks of thyrod hormone suppression discussed - discussed importance of Tg monitoring   2. Obesity - hard time losing weight, did not start MFN bc she needs dental work - suggest weight management referral, Dr Gifford  3. dry mount, salivary gland issue post TA - ENT follow up  4. Bone health- DXA monitoed by Gyn, Dr Mooney   45 min spent - chart review, discussion of thyroid cancer treatment and monitoring, discussion of weight issues

## 2024-06-18 NOTE — REVIEW OF SYSTEMS
[As Noted in HPI] : as noted in HPI [Palpitations] : no palpitations [Shortness Of Breath] : no shortness of breath [Tremors] : no tremors [Insomnia] : no insomnia [Anxiety] : no anxiety [Heat Intolerance] : no heat intolerance [FreeTextEntry7] : IBS - constipation prone [FreeTextEntry8] : post menopausal, no menses since [facility] >5 yrs ago She use to see another endo who retired.  Per patient , has been diabetic since 2016. Currently taking metformin 1000  mg bid , trulicity 3 mg weekly and jardiance 25 mg daily FS in office 162 current severity: uncontrolled A1C  8.9% in 5/22 , 9.5% in 4/23, 7.3% in 7/23, 7.4% in 1/24, 7.3% in 6/24

## 2024-06-18 NOTE — HISTORY OF PRESENT ILLNESS
[FreeTextEntry1] : Interval hx  - pt of Dr Echavarria, first time seeing me has been on suppressive doses of thyroid hormone since diagnosis since TA therapy - salivary gland issues resulting in teeth decay, brittle teeth and needs dental work she was prescribed MFN for weight gain but never started taking it due to issues with teeth and warning on package label gaining weight, stopped walking, diet - crave sweets no recent neck sono ================================================================= Thyroid nodules Dx in 2019 after daughter diagnoses with thyroid cancer 7/2019 s/p FNA biopsy of Left nodule and Isthmus nodule. Path: Papillary thyroid ca, Ca 5 8/2019 s/p Total thyroidectomy. Path: Papillary thyroid cancer, Tall cell variant with diffuse necrosis. Left lobe tumor size 4.2 cm. Right lobe tumor size 2.5 cm. Largest lesions approx 6 cm. (+) extrathyroidal extension - invading subcutaneous soft tissues, larynx, trachea, esophagus or recurrent laryngeal nerve. (+) angioinvasion <4 vessels. No lymphatic invasion.  STage [id number], Stage CANDIDO  11/2019 Thyrogen stim Pretreatment scan: radioiodine avid tissue in thyroid bed, no distant mets, 28 hour uptake 1% 12/2019 s/p remnant ablation I-131 149.3 mCi 12/2019 Post treatment scan: iodine avid tissue in thyroid bed, no distant mets  12/2020 increased Tg level (tg 1.7 bu done at different labs). Thyrogen SPECT/CT, WBS - normal study, no avid tissue in thyroid bed. no distant mets. Thyrogen stim Tg <0.20, tg ab neg.  11/2022 Thyrogen stim WBS: normal scan, no avid tissue in thyroid bed.  no distant mets. 48 hour uptake <1%. Thyrogen stim Tg <0.2, neg Tg ab  Thyroglobulin level  5524-5835 Tg neg, tg ab neg TSh level [id number] 0.19 --> 0.03 --> 0.05  Current thyroid med: LT4 150 mcg daily, adherent with dosing and adminitration

## 2024-06-18 NOTE — PHYSICAL EXAM
[Alert] : alert [No Acute Distress] : no acute distress [EOMI] : extra ocular movement intact [No LAD] : no lymphadenopathy [Well Healed Scar] : well healed scar [No Respiratory Distress] : no respiratory distress [Clear to Auscultation] : lungs were clear to auscultation bilaterally [Normal S1, S2] : normal S1 and S2 [Normal Rate] : heart rate was normal [Normal Reflexes] : deep tendon reflexes were 2+ and symmetric [Oriented x3] : oriented to person, place, and time [Normal Affect] : the affect was normal [Normal Insight/Judgement] : insight and judgment were intact [Normal Mood] : the mood was normal

## 2024-09-23 ENCOUNTER — RX RENEWAL (OUTPATIENT)
Age: 59
End: 2024-09-23

## 2024-10-10 ENCOUNTER — APPOINTMENT (OUTPATIENT)
Dept: ENDOCRINOLOGY | Facility: CLINIC | Age: 59
End: 2024-10-10

## 2025-01-30 ENCOUNTER — APPOINTMENT (OUTPATIENT)
Dept: ENDOCRINOLOGY | Facility: CLINIC | Age: 60
End: 2025-01-30
Payer: COMMERCIAL

## 2025-01-30 VITALS
WEIGHT: 212 LBS | DIASTOLIC BLOOD PRESSURE: 74 MMHG | HEART RATE: 86 BPM | BODY MASS INDEX: 37.56 KG/M2 | OXYGEN SATURATION: 99 % | SYSTOLIC BLOOD PRESSURE: 122 MMHG | HEIGHT: 63 IN

## 2025-01-30 DIAGNOSIS — C73 MALIGNANT NEOPLASM OF THYROID GLAND: ICD-10-CM

## 2025-01-30 DIAGNOSIS — E89.0 POSTPROCEDURAL HYPOTHYROIDISM: ICD-10-CM

## 2025-01-30 PROCEDURE — 99214 OFFICE O/P EST MOD 30 MIN: CPT

## 2025-01-30 RX ORDER — FERROUS SULFATE, DRIED 160(50) MG
TABLET, EXTENDED RELEASE ORAL
Refills: 0 | Status: ACTIVE | COMMUNITY

## 2025-01-30 RX ORDER — CEPHRADINE 500 MG
CAPSULE ORAL
Refills: 0 | Status: ACTIVE | COMMUNITY

## 2025-01-30 RX ORDER — OLMESARTAN MEDOXOMIL 20 MG/1
20 TABLET, FILM COATED ORAL
Refills: 0 | Status: ACTIVE | COMMUNITY

## 2025-04-16 ENCOUNTER — NON-APPOINTMENT (OUTPATIENT)
Age: 60
End: 2025-04-16

## 2025-04-18 ENCOUNTER — APPOINTMENT (OUTPATIENT)
Dept: GASTROENTEROLOGY | Facility: CLINIC | Age: 60
End: 2025-04-18

## 2025-04-18 VITALS
DIASTOLIC BLOOD PRESSURE: 85 MMHG | RESPIRATION RATE: 14 BRPM | HEART RATE: 84 BPM | TEMPERATURE: 97.6 F | BODY MASS INDEX: 38.09 KG/M2 | HEIGHT: 63 IN | SYSTOLIC BLOOD PRESSURE: 142 MMHG | OXYGEN SATURATION: 98 % | WEIGHT: 215 LBS

## 2025-04-18 DIAGNOSIS — E61.1 IRON DEFICIENCY: ICD-10-CM

## 2025-04-18 DIAGNOSIS — K59.09 OTHER CONSTIPATION: ICD-10-CM

## 2025-04-18 DIAGNOSIS — Z71.9 COUNSELING, UNSPECIFIED: ICD-10-CM

## 2025-04-18 PROCEDURE — 99203 OFFICE O/P NEW LOW 30 MIN: CPT

## 2025-04-18 RX ORDER — POLYETHYLENE GLYCOL 3350 17 G/17G
17 POWDER, FOR SOLUTION ORAL
Qty: 1 | Refills: 11 | Status: ACTIVE | COMMUNITY
Start: 2025-04-18 | End: 1900-01-01

## 2025-06-02 ENCOUNTER — LABORATORY RESULT (OUTPATIENT)
Age: 60
End: 2025-06-02

## 2025-06-04 ENCOUNTER — APPOINTMENT (OUTPATIENT)
Dept: ENDOCRINOLOGY | Facility: CLINIC | Age: 60
End: 2025-06-04
Payer: COMMERCIAL

## 2025-06-04 VITALS
HEART RATE: 86 BPM | SYSTOLIC BLOOD PRESSURE: 126 MMHG | OXYGEN SATURATION: 97 % | BODY MASS INDEX: 38.09 KG/M2 | HEIGHT: 63 IN | DIASTOLIC BLOOD PRESSURE: 86 MMHG | WEIGHT: 215 LBS

## 2025-06-04 DIAGNOSIS — C73 MALIGNANT NEOPLASM OF THYROID GLAND: ICD-10-CM

## 2025-06-04 DIAGNOSIS — E89.0 POSTPROCEDURAL HYPOTHYROIDISM: ICD-10-CM

## 2025-06-04 PROCEDURE — G2211 COMPLEX E/M VISIT ADD ON: CPT | Mod: NC

## 2025-06-04 PROCEDURE — 99214 OFFICE O/P EST MOD 30 MIN: CPT

## 2025-06-04 RX ORDER — ROSUVASTATIN CALCIUM 10 MG/1
10 TABLET, FILM COATED ORAL
Qty: 90 | Refills: 1 | Status: ACTIVE | COMMUNITY

## 2025-06-04 RX ORDER — ASPIRIN 81 MG
81 TABLET, DELAYED RELEASE (ENTERIC COATED) ORAL
Refills: 0 | Status: ACTIVE | COMMUNITY

## 2025-06-20 ENCOUNTER — TRANSCRIPTION ENCOUNTER (OUTPATIENT)
Age: 60
End: 2025-06-20

## 2025-06-30 ENCOUNTER — RESULT REVIEW (OUTPATIENT)
Age: 60
End: 2025-06-30

## 2025-06-30 ENCOUNTER — OUTPATIENT (OUTPATIENT)
Dept: OUTPATIENT SERVICES | Facility: HOSPITAL | Age: 60
LOS: 1 days | End: 2025-06-30
Payer: COMMERCIAL

## 2025-06-30 ENCOUNTER — APPOINTMENT (OUTPATIENT)
Dept: GASTROENTEROLOGY | Facility: GI CENTER | Age: 60
End: 2025-06-30
Payer: COMMERCIAL

## 2025-06-30 ENCOUNTER — TRANSCRIPTION ENCOUNTER (OUTPATIENT)
Age: 60
End: 2025-06-30

## 2025-06-30 DIAGNOSIS — E61.1 IRON DEFICIENCY: ICD-10-CM

## 2025-06-30 PROBLEM — K29.60 EROSIVE GASTRITIS: Status: ACTIVE | Noted: 2025-06-30

## 2025-06-30 PROCEDURE — 88342 IMHCHEM/IMCYTCHM 1ST ANTB: CPT

## 2025-06-30 PROCEDURE — 45378 DIAGNOSTIC COLONOSCOPY: CPT | Mod: 59

## 2025-06-30 PROCEDURE — 88341 IMHCHEM/IMCYTCHM EA ADD ANTB: CPT

## 2025-06-30 PROCEDURE — 88305 TISSUE EXAM BY PATHOLOGIST: CPT | Mod: 26

## 2025-06-30 PROCEDURE — 88305 TISSUE EXAM BY PATHOLOGIST: CPT

## 2025-06-30 PROCEDURE — 43239 EGD BIOPSY SINGLE/MULTIPLE: CPT

## 2025-06-30 PROCEDURE — 88342 IMHCHEM/IMCYTCHM 1ST ANTB: CPT | Mod: 26

## 2025-06-30 PROCEDURE — 88341 IMHCHEM/IMCYTCHM EA ADD ANTB: CPT | Mod: 26

## 2025-06-30 PROCEDURE — 45378 DIAGNOSTIC COLONOSCOPY: CPT

## 2025-06-30 RX ORDER — PANTOPRAZOLE 40 MG/1
40 TABLET, DELAYED RELEASE ORAL
Qty: 20 | Refills: 0 | Status: ACTIVE | COMMUNITY
Start: 2025-06-30 | End: 1900-01-01

## 2025-07-08 LAB — SURGICAL PATHOLOGY STUDY: SIGNIFICANT CHANGE UP

## 2025-07-17 ENCOUNTER — APPOINTMENT (OUTPATIENT)
Dept: GASTROENTEROLOGY | Facility: CLINIC | Age: 60
End: 2025-07-17

## 2025-07-17 PROCEDURE — 91110 GI TRC IMG INTRAL ESOPH-ILE: CPT

## 2025-07-23 ENCOUNTER — TRANSCRIPTION ENCOUNTER (OUTPATIENT)
Age: 60
End: 2025-07-23

## 2025-08-01 RX ORDER — LINACLOTIDE 145 UG/1
145 CAPSULE, GELATIN COATED ORAL
Qty: 30 | Refills: 3 | Status: ACTIVE | COMMUNITY
Start: 2025-08-01 | End: 1900-01-01

## 2025-08-29 ENCOUNTER — APPOINTMENT (OUTPATIENT)
Dept: HEMATOLOGY ONCOLOGY | Facility: CLINIC | Age: 60
End: 2025-08-29
Payer: COMMERCIAL

## 2025-08-29 ENCOUNTER — RESULT REVIEW (OUTPATIENT)
Age: 60
End: 2025-08-29

## 2025-08-29 VITALS
DIASTOLIC BLOOD PRESSURE: 82 MMHG | WEIGHT: 214.51 LBS | OXYGEN SATURATION: 97 % | BODY MASS INDEX: 38.01 KG/M2 | HEART RATE: 92 BPM | TEMPERATURE: 98.1 F | SYSTOLIC BLOOD PRESSURE: 133 MMHG | HEIGHT: 63 IN

## 2025-08-29 DIAGNOSIS — E61.1 IRON DEFICIENCY: ICD-10-CM

## 2025-08-29 PROCEDURE — 99242 OFF/OP CONSLTJ NEW/EST SF 20: CPT

## 2025-09-02 LAB
ALBUMIN SERPL ELPH-MCNC: 4.5 G/DL
ALP BLD-CCNC: 104 U/L
ALT SERPL-CCNC: 32 U/L
ANION GAP SERPL CALC-SCNC: 14 MMOL/L
AST SERPL-CCNC: 22 U/L
BILIRUB SERPL-MCNC: 0.3 MG/DL
BUN SERPL-MCNC: 11 MG/DL
CALCIUM SERPL-MCNC: 10.3 MG/DL
CHLORIDE SERPL-SCNC: 103 MMOL/L
CO2 SERPL-SCNC: 25 MMOL/L
CREAT SERPL-MCNC: 0.97 MG/DL
EGFRCR SERPLBLD CKD-EPI 2021: 66 ML/MIN/1.73M2
FERRITIN SERPL-MCNC: 25 NG/ML
FOLATE SERPL-MCNC: 10.5 NG/ML
GLUCOSE SERPL-MCNC: 98 MG/DL
IRON SATN MFR SERPL: 22 %
IRON SERPL-MCNC: 84 UG/DL
POTASSIUM SERPL-SCNC: 5.2 MMOL/L
PROT SERPL-MCNC: 7.1 G/DL
SODIUM SERPL-SCNC: 143 MMOL/L
TIBC SERPL-MCNC: 381 UG/DL
UIBC SERPL-MCNC: 297 UG/DL
VIT B12 SERPL-MCNC: 952 PG/ML

## (undated) DEVICE — KIT DEFENDO 4 OLY 4 PC

## (undated) DEVICE — FORCEP ENDOJAW AGTR LC W NDL 2.8MM 230CM DISP